# Patient Record
(demographics unavailable — no encounter records)

---

## 2025-02-24 NOTE — REASON FOR VISIT
[Symptom and Test Evaluation] : symptom and test evaluation [Hyperlipidemia] : hyperlipidemia [Hypertension] : hypertension [FreeTextEntry1] : This is a very pleasant 66 year old woman here for evaluation of HTN and HLD. The patient has had elevated BP since at least 2022 but has declined treatment. Recently her new PMD started her on amlodipine which was escalated to 5 mg. She has had no adverse reactions.  She does not take medication for HLD.  FH: Father with HTN SH: does not smoke or drink alcohol, she exercises at the gym  1. Check echo and nuclear stress test. Check carotid Dopplers. 2. HTN. BP well controlled upon reevaluation. 3. We have reviewed current AHA exercise guidelines, including 40 minutes 4 days per week of moderate level aerobic activity and 20 minutes twice per week of strength training. 4. We reviewed BP, LDL and A1C targets.

## 2025-02-24 NOTE — ASSESSMENT
[FreeTextEntry1] : 1. Check echo and nuclear stress test. Check carotid Dopplers. 2. HTN. BP well controlled upon reevaluation. 3. We have reviewed current AHA exercise guidelines, including 40 minutes 4 days per week of moderate level aerobic activity and 20 minutes twice per week of strength training. 4. We reviewed BP, LDL and A1C targets.

## 2025-05-21 NOTE — REASON FOR VISIT
[Symptom and Test Evaluation] : symptom and test evaluation [Hypertension] : hypertension [FreeTextEntry1] : Generally doing well, no CP or dyspnea. Not exercising Stress normal, but with accelerated BP and HR Echo normal CD excellent with 1-15% plaque bilateral Blood work with PMD  1. HTN. Blood pressure remains elevated, even upon reevaluation. We have reviewed current AHA exercise guidelines, including 40 minutes 4 days per week of moderate level aerobic activity and 20 minutes twice per week of strength training. Reevaluate in 6m

## 2025-05-21 NOTE — ASSESSMENT
[FreeTextEntry1] : 1. HTN. Blood pressure remains elevated, even upon reevaluation. We have reviewed current AHA exercise guidelines, including 40 minutes 4 days per week of moderate level aerobic activity and 20 minutes twice per week of strength training. Reevaluate in 6m